# Patient Record
Sex: FEMALE | Race: WHITE | NOT HISPANIC OR LATINO | Employment: OTHER | URBAN - METROPOLITAN AREA
[De-identification: names, ages, dates, MRNs, and addresses within clinical notes are randomized per-mention and may not be internally consistent; named-entity substitution may affect disease eponyms.]

---

## 2024-01-19 NOTE — PROGRESS NOTES
"Zulay Elliott is a 83 y.o. female who is here for gyn problem visit  PCP = Garry Hernandez MD  She states that she had an MRI at [facility] in 11/2023, which showed uterine fibroids. She denies any pelvic pain, or abnormal bleeding. Also denies any urinary or BM problems      Social History     Substance and Sexual Activity   Sexual Activity Not Currently       SoHx:  Substance:   Tobacco Use: Medium Risk (1/22/2024)    Patient History     Smoking Tobacco Use: Former     Smokeless Tobacco Use: Never     Passive Exposure: Not on file      Social History     Substance and Sexual Activity   Drug Use Never      Social History     Substance and Sexual Activity   Alcohol Use Yes    Comment: seldom       No past medical history on file.   Past Surgical History:   Procedure Laterality Date    CATARACT EXTRACTION      REVISION TOTAL HIP ARTHROPLASTY Bilateral     SPINAL CORD STIMULATOR IMPLANT      SPINAL FUSION      TUBAL LIGATION        Objective   /70   Ht 1.588 m (5' 2.5\")   Wt 59.9 kg (132 lb)   BMI 23.76 kg/m²      General:   Alert and oriented, in no acute distress   Neck: Supple. No visible thyromegaly.    Breast/Axilla: Normal to palpation bilaterally without masses, skin changes, or nipple discharge.    Abdomen: Soft, non-tender, without masses or organomegaly   Psych Normal affect. Normal mood.      Problem List Items Addressed This Visit       Abnormal abdominal MRI    Relevant Orders    US PELVIS TRANSABDOMINAL WITH TRANSVAGINAL    Intramural leiomyoma of uterus - Primary    Relevant Orders    US PELVIS TRANSABDOMINAL WITH TRANSVAGINAL     Other Visit Diagnoses       Asymptomatic menopause        Relevant Orders    XR DEXA bone density    Screening mammogram for breast cancer        Relevant Orders    BI mammo bilateral screening tomosynthesis           Assessment/Plan    Thank you for coming to your annual exam. Your findings during the exam were normal. Specific topics addressed during this " exam included: healthy lifestyle, well woman screening guidelines,  Healthy diet with high fiber, Weight bearing exercise 3 to 5 times a week, Multivitamins and calcium     Actions performed during this visit include:  - Pelvic ultrasound ordered  -Mammogram and bone density scan ordered today    Orders Placed This Encounter   Procedures    BI mammo bilateral screening tomosynthesis     Standing Status:   Future     Standing Expiration Date:   3/22/2025     Order Specific Question:   Reason for exam:     Answer:   routine annual screening     Order Specific Question:   Radiologist to Determine Optimal Study     Answer:   Yes     Order Specific Question:   Release result to [email or web address]     Answer:   Immediate [1]     Order Specific Question:   Is this exam part of a Research Study? If Yes, link this order to the research study     Answer:   No    XR DEXA bone density     Standing Status:   Future     Standing Expiration Date:   1/22/2025     Order Specific Question:   Reason for exam:     Answer:   bone density     Order Specific Question:   Radiologist to Determine Optimal Study     Answer:   Yes     Order Specific Question:   Release result to [employer]     Answer:   Immediate [1]     Order Specific Question:   Is this exam part of a Research Study? If Yes, link this order to the research study     Answer:   No    US PELVIS TRANSABDOMINAL WITH TRANSVAGINAL     Standing Status:   Future     Standing Expiration Date:   1/22/2025     Order Specific Question:   Reason for exam:     Answer:   fibroid with history of fibroids     Order Specific Question:   Radiologist to Determine Optimal Study     Answer:   Yes     Order Specific Question:   Release result to [email or web address]     Answer:   Immediate [1]     Order Specific Question:   Is this exam part of a Research Study? If Yes, link this order to the research study     Answer:   No        Please return for your next visit in 1 year.

## 2024-01-22 ENCOUNTER — OFFICE VISIT (OUTPATIENT)
Dept: OBSTETRICS AND GYNECOLOGY | Facility: CLINIC | Age: 84
End: 2024-01-22
Payer: MEDICARE

## 2024-01-22 VITALS
HEIGHT: 63 IN | DIASTOLIC BLOOD PRESSURE: 70 MMHG | SYSTOLIC BLOOD PRESSURE: 140 MMHG | WEIGHT: 132 LBS | BODY MASS INDEX: 23.39 KG/M2

## 2024-01-22 DIAGNOSIS — D25.1 INTRAMURAL LEIOMYOMA OF UTERUS: Primary | ICD-10-CM

## 2024-01-22 DIAGNOSIS — Z12.31 SCREENING MAMMOGRAM FOR BREAST CANCER: ICD-10-CM

## 2024-01-22 DIAGNOSIS — R93.5 ABNORMAL ABDOMINAL MRI: ICD-10-CM

## 2024-01-22 DIAGNOSIS — Z78.0 ASYMPTOMATIC MENOPAUSE: ICD-10-CM

## 2024-01-22 PROCEDURE — 1036F TOBACCO NON-USER: CPT | Performed by: OBSTETRICS & GYNECOLOGY

## 2024-01-22 PROCEDURE — 99214 OFFICE O/P EST MOD 30 MIN: CPT | Performed by: OBSTETRICS & GYNECOLOGY

## 2024-01-22 PROCEDURE — 1159F MED LIST DOCD IN RCRD: CPT | Performed by: OBSTETRICS & GYNECOLOGY

## 2024-01-22 RX ORDER — TIZANIDINE 4 MG/1
TABLET ORAL
COMMUNITY
Start: 2023-12-22

## 2024-01-22 RX ORDER — TRAMADOL HYDROCHLORIDE 50 MG/1
TABLET ORAL
COMMUNITY
Start: 2023-12-22

## 2024-01-22 RX ORDER — LORATADINE 10 MG/1
10 TABLET ORAL DAILY
COMMUNITY
Start: 2023-04-02

## 2024-01-22 RX ORDER — GABAPENTIN 300 MG/1
CAPSULE ORAL
COMMUNITY
Start: 2024-01-11

## 2024-01-22 RX ORDER — ATORVASTATIN CALCIUM 20 MG/1
20 TABLET, FILM COATED ORAL DAILY
COMMUNITY
Start: 2023-11-24

## 2024-01-22 RX ORDER — AMLODIPINE BESYLATE 5 MG/1
5 TABLET ORAL DAILY
COMMUNITY
Start: 2023-11-24

## 2024-01-24 ENCOUNTER — APPOINTMENT (OUTPATIENT)
Dept: OBSTETRICS AND GYNECOLOGY | Facility: CLINIC | Age: 84
End: 2024-01-24
Payer: MEDICARE

## 2024-01-30 ENCOUNTER — HOSPITAL ENCOUNTER (OUTPATIENT)
Dept: RADIOLOGY | Facility: CLINIC | Age: 84
Discharge: HOME | End: 2024-01-30
Payer: MEDICARE

## 2024-01-30 DIAGNOSIS — R93.5 ABNORMAL ABDOMINAL MRI: ICD-10-CM

## 2024-01-30 DIAGNOSIS — D25.1 INTRAMURAL LEIOMYOMA OF UTERUS: ICD-10-CM

## 2024-01-30 PROCEDURE — 76830 TRANSVAGINAL US NON-OB: CPT | Performed by: RADIOLOGY

## 2024-01-30 PROCEDURE — 76830 TRANSVAGINAL US NON-OB: CPT

## 2024-01-30 PROCEDURE — 76857 US EXAM PELVIC LIMITED: CPT | Performed by: RADIOLOGY

## 2024-02-02 ENCOUNTER — TELEPHONE (OUTPATIENT)
Dept: OBSTETRICS AND GYNECOLOGY | Facility: CLINIC | Age: 84
End: 2024-02-02
Payer: MEDICARE

## 2024-02-02 NOTE — TELEPHONE ENCOUNTER
----- Message from Awilda Sauer MD sent at 2/1/2024  7:21 PM EST -----  Pelvic ultrasound reviewed, normal-sized uterus with 4 cm subserosal fibroid noted.  Patient is asymptomatic so no further follow-up is needed unless postmenopausal bleeding or pelvic pain occurs  ----- Message -----  From: Interface, Radiology Results In  Sent: 1/31/2024   5:45 PM EST  To: Awilda Sauer MD

## 2024-02-05 ENCOUNTER — HOSPITAL ENCOUNTER (OUTPATIENT)
Dept: RADIOLOGY | Facility: HOSPITAL | Age: 84
Discharge: HOME | End: 2024-02-05
Payer: MEDICARE

## 2024-02-05 DIAGNOSIS — Z12.31 SCREENING MAMMOGRAM FOR BREAST CANCER: ICD-10-CM

## 2024-02-05 PROCEDURE — 77067 SCR MAMMO BI INCL CAD: CPT

## 2024-02-05 PROCEDURE — 77063 BREAST TOMOSYNTHESIS BI: CPT

## 2024-02-14 ENCOUNTER — TELEPHONE (OUTPATIENT)
Dept: OBSTETRICS AND GYNECOLOGY | Facility: CLINIC | Age: 84
End: 2024-02-14
Payer: MEDICARE

## 2024-02-14 ENCOUNTER — HOSPITAL ENCOUNTER (OUTPATIENT)
Dept: RADIOLOGY | Facility: CLINIC | Age: 84
Discharge: HOME | End: 2024-02-14
Payer: MEDICARE

## 2024-02-14 DIAGNOSIS — M81.0 AGE RELATED OSTEOPOROSIS, UNSPECIFIED PATHOLOGICAL FRACTURE PRESENCE: ICD-10-CM

## 2024-02-14 DIAGNOSIS — Z78.0 ASYMPTOMATIC MENOPAUSE: ICD-10-CM

## 2024-02-14 PROCEDURE — 77080 DXA BONE DENSITY AXIAL: CPT

## 2024-02-14 PROCEDURE — 77080 DXA BONE DENSITY AXIAL: CPT | Performed by: RADIOLOGY

## 2024-02-14 NOTE — TELEPHONE ENCOUNTER
Called and informed patient of DEXA results. Will patient Prolia information to patient while office obtains summary of benefits. Calcium level will need to be drawn within one month of injection.

## 2024-02-14 NOTE — TELEPHONE ENCOUNTER
----- Message from Awilda Sauer MD sent at 2/14/2024  2:24 PM EST -----  DEXA with osteoporosis. Please schedule for Prolia  ----- Message -----  From: Interface, Radiology Results In  Sent: 2/14/2024  11:17 AM EST  To: Awilda Sauer MD

## 2024-02-21 ENCOUNTER — TELEPHONE (OUTPATIENT)
Dept: OBSTETRICS AND GYNECOLOGY | Facility: CLINIC | Age: 84
End: 2024-02-21
Payer: MEDICARE

## 2024-02-21 NOTE — TELEPHONE ENCOUNTER
Spoke with patient again. According to the summary of benefits, Medicare supplement insurance will cover the coinsurance and 100% of the excess charges. Patient notified. She will read more about Prolia and give the office a call. Patient informed that she will need to have her calcium level drawn before we can schedule her injection appointment.

## 2024-02-21 NOTE — TELEPHONE ENCOUNTER
Summary of Benefits discusses with patient. Patient would have to pay $240 deductible which is met and a 20.0% coinsurnace. At this time the out of pocket cost would be $370.40 and patient is unable to afford that with a fixed income. Will reach out to the [employer] representative and see if she can help look into secondary supplement insurance with office staff.

## 2024-04-17 ENCOUNTER — TELEPHONE (OUTPATIENT)
Dept: OBSTETRICS AND GYNECOLOGY | Facility: CLINIC | Age: 84
End: 2024-04-17
Payer: MEDICARE

## 2024-04-17 NOTE — TELEPHONE ENCOUNTER
Spoke with patient again. Patient just had a total knee replacement done. Patient did speak to PCP, Dr. Hernandez about the Prolia and he agrees that it would be a good treatment for her osteoporosis, however, she needs to recover from her knee replacement first.

## 2024-07-15 ENCOUNTER — TELEPHONE (OUTPATIENT)
Dept: OBSTETRICS AND GYNECOLOGY | Facility: CLINIC | Age: 84
End: 2024-07-15
Payer: MEDICARE

## 2024-07-17 NOTE — TELEPHONE ENCOUNTER
Patient called and informed that serum Calcium level order is in the EMR. Once her level is drawn and WNL, the office can order Prolia and once Prolia arrives the office will schedule injection appointment with patient.

## 2024-07-18 ENCOUNTER — LAB (OUTPATIENT)
Dept: LAB | Facility: LAB | Age: 84
End: 2024-07-18
Payer: MEDICARE

## 2024-07-18 DIAGNOSIS — M81.0 AGE RELATED OSTEOPOROSIS, UNSPECIFIED PATHOLOGICAL FRACTURE PRESENCE: ICD-10-CM

## 2024-07-18 LAB — CALCIUM SERPL-MCNC: 8.8 MG/DL (ref 8.6–10.3)

## 2024-07-18 PROCEDURE — 82310 ASSAY OF CALCIUM: CPT

## 2024-07-18 PROCEDURE — 36415 COLL VENOUS BLD VENIPUNCTURE: CPT

## 2024-07-24 ENCOUNTER — CLINICAL SUPPORT (OUTPATIENT)
Dept: OBSTETRICS AND GYNECOLOGY | Facility: CLINIC | Age: 84
End: 2024-07-24
Payer: MEDICARE

## 2024-07-24 DIAGNOSIS — M81.0 AGE RELATED OSTEOPOROSIS, UNSPECIFIED PATHOLOGICAL FRACTURE PRESENCE: ICD-10-CM

## 2024-07-24 PROCEDURE — 96372 THER/PROPH/DIAG INJ SC/IM: CPT | Performed by: OBSTETRICS & GYNECOLOGY

## 2024-07-24 NOTE — PROGRESS NOTES
Patient here for initial Prolia injection. Prolia Buy and Bill from office. No PA required. Prolia given in the upper outer aspect of right arm. Calcium level WNL 8.8 on 7/18/24. Next injection due in 6 months.

## 2025-01-06 ENCOUNTER — TELEPHONE (OUTPATIENT)
Dept: OBSTETRICS AND GYNECOLOGY | Facility: CLINIC | Age: 85
End: 2025-01-06
Payer: MEDICARE

## 2025-01-06 DIAGNOSIS — M81.0 AGE RELATED OSTEOPOROSIS, UNSPECIFIED PATHOLOGICAL FRACTURE PRESENCE: ICD-10-CM

## 2025-01-06 NOTE — TELEPHONE ENCOUNTER
Patient called because her Prolia is due in a couple weeks and wasn't sure if it was ordered yet and if she needed to do the calcium test. Please advise.

## 2025-01-07 NOTE — TELEPHONE ENCOUNTER
Serum Calcium level ordered. Patient called and informed that once serum Calcium results are back and WNL, office will order Prolia and then call patient to schedule appointment.

## 2025-01-14 ENCOUNTER — LAB (OUTPATIENT)
Dept: LAB | Facility: LAB | Age: 85
End: 2025-01-14
Payer: MEDICARE

## 2025-01-14 DIAGNOSIS — M81.0 AGE RELATED OSTEOPOROSIS, UNSPECIFIED PATHOLOGICAL FRACTURE PRESENCE: ICD-10-CM

## 2025-01-14 LAB — CALCIUM SERPL-MCNC: 9 MG/DL (ref 8.6–10.3)

## 2025-01-14 PROCEDURE — 82310 ASSAY OF CALCIUM: CPT

## 2025-01-24 ENCOUNTER — CLINICAL SUPPORT (OUTPATIENT)
Dept: OBSTETRICS AND GYNECOLOGY | Facility: CLINIC | Age: 85
End: 2025-01-24
Payer: MEDICARE

## 2025-01-24 DIAGNOSIS — M81.0 AGE RELATED OSTEOPOROSIS, UNSPECIFIED PATHOLOGICAL FRACTURE PRESENCE: ICD-10-CM

## 2025-01-24 PROCEDURE — 96372 THER/PROPH/DIAG INJ SC/IM: CPT | Performed by: OBSTETRICS & GYNECOLOGY

## 2025-01-24 NOTE — PROGRESS NOTES
Patient here for Prolia injection. Patient due for Prolia today. Serum Calcium level within normal limits 11/14/25 at 9.0. No prior authorization required. Buy and bill from office. Next injection due in 6 months.

## 2025-07-10 ENCOUNTER — TELEPHONE (OUTPATIENT)
Dept: OBSTETRICS AND GYNECOLOGY | Facility: CLINIC | Age: 85
End: 2025-07-10
Payer: MEDICARE

## 2025-07-10 DIAGNOSIS — M81.0 AGE RELATED OSTEOPOROSIS, UNSPECIFIED PATHOLOGICAL FRACTURE PRESENCE: ICD-10-CM

## 2025-07-10 NOTE — TELEPHONE ENCOUNTER
Patient called and states that she is ready for her Prolia. Would like to know if she can have an order to get her calcium level done next week. And wants to know if we can order her medication.

## 2025-07-16 LAB — CALCIUM SERPL-MCNC: 9 MG/DL (ref 8.6–10.4)

## 2025-07-25 ENCOUNTER — APPOINTMENT (OUTPATIENT)
Dept: OBSTETRICS AND GYNECOLOGY | Facility: CLINIC | Age: 85
End: 2025-07-25
Payer: MEDICARE

## 2025-07-28 ENCOUNTER — APPOINTMENT (OUTPATIENT)
Dept: OBSTETRICS AND GYNECOLOGY | Facility: CLINIC | Age: 85
End: 2025-07-28
Payer: MEDICARE

## 2025-07-28 DIAGNOSIS — M81.0 AGE RELATED OSTEOPOROSIS, UNSPECIFIED PATHOLOGICAL FRACTURE PRESENCE: ICD-10-CM

## 2025-07-28 PROCEDURE — 96372 THER/PROPH/DIAG INJ SC/IM: CPT | Performed by: NURSE PRACTITIONER

## 2025-07-28 NOTE — PROGRESS NOTES
Patient due for Prolia   Prolia obtained through buy and bill  Serum Calcium level 9.0 on 7/15/25  Prolia given subcutaneous left upper arm   Patient tolerated well  Next Prolia due in 6 months  Reminder card given